# Patient Record
Sex: FEMALE | URBAN - METROPOLITAN AREA
[De-identification: names, ages, dates, MRNs, and addresses within clinical notes are randomized per-mention and may not be internally consistent; named-entity substitution may affect disease eponyms.]

---

## 2018-09-06 ENCOUNTER — TELEPHONE (OUTPATIENT)
Dept: NEUROLOGY | Facility: CLINIC | Age: 83
End: 2018-09-06

## 2018-09-06 NOTE — TELEPHONE ENCOUNTER
Called patients daughter. She stated she has a 10 [address] to get her 82 yr old here. She asked what tests would be ordered. I let her know we have no way of knowing what will be ordered. Suggested that she contact the referring MD to see if there was some testing he would consider doing or possibly a MD closer. Daughter did not want to drive 10 hrs to get here. Number given to contact me for advice.